# Patient Record
Sex: MALE | Race: WHITE | Employment: OTHER | URBAN - NONMETROPOLITAN AREA
[De-identification: names, ages, dates, MRNs, and addresses within clinical notes are randomized per-mention and may not be internally consistent; named-entity substitution may affect disease eponyms.]

---

## 2021-03-08 ENCOUNTER — OFFICE VISIT (OUTPATIENT)
Dept: DERMATOLOGY | Facility: OTHER | Age: 71
End: 2021-03-08
Attending: DERMATOLOGY
Payer: COMMERCIAL

## 2021-03-08 VITALS
OXYGEN SATURATION: 97 % | DIASTOLIC BLOOD PRESSURE: 72 MMHG | SYSTOLIC BLOOD PRESSURE: 122 MMHG | HEART RATE: 95 BPM | RESPIRATION RATE: 16 BRPM | TEMPERATURE: 97.9 F

## 2021-03-08 DIAGNOSIS — L30.9 DERMATITIS: ICD-10-CM

## 2021-03-08 DIAGNOSIS — L98.9 SKIN LESION: Primary | ICD-10-CM

## 2021-03-08 PROCEDURE — 99202 OFFICE O/P NEW SF 15 MIN: CPT | Mod: 25 | Performed by: DERMATOLOGY

## 2021-03-08 PROCEDURE — 88342 IMHCHEM/IMCYTCHM 1ST ANTB: CPT | Mod: TC | Performed by: DERMATOLOGY

## 2021-03-08 PROCEDURE — 11102 TANGNTL BX SKIN SINGLE LES: CPT | Performed by: DERMATOLOGY

## 2021-03-08 PROCEDURE — 88305 TISSUE EXAM BY PATHOLOGIST: CPT | Mod: TC | Performed by: DERMATOLOGY

## 2021-03-08 RX ORDER — HYDROCHLOROTHIAZIDE 12.5 MG/1
12.5 TABLET ORAL DAILY
COMMUNITY

## 2021-03-08 RX ORDER — TRIAMCINOLONE ACETONIDE 1 MG/G
CREAM TOPICAL
Qty: 30 G | Refills: 3 | Status: SHIPPED | OUTPATIENT
Start: 2021-03-08

## 2021-03-08 RX ORDER — CETIRIZINE HYDROCHLORIDE 10 MG/1
10 TABLET ORAL DAILY
COMMUNITY

## 2021-03-08 RX ORDER — ROSUVASTATIN CALCIUM 40 MG/1
20 TABLET, COATED ORAL DAILY
COMMUNITY

## 2021-03-08 RX ORDER — LISINOPRIL 20 MG/1
10 TABLET ORAL 2 TIMES DAILY
COMMUNITY

## 2021-03-08 ASSESSMENT — PAIN SCALES - GENERAL: PAINLEVEL: NO PAIN (0)

## 2021-03-08 NOTE — NURSING NOTE
Chief Complaint   Patient presents with     Consult     Rash       Initial /72   Pulse 95   Temp 97.9  F (36.6  C) (Tympanic)   Resp 16   SpO2 97%  There is no height or weight on file to calculate BMI.  Medication Reconciliation: complete  Lauryn Tapia LPN

## 2021-03-08 NOTE — LETTER
3/8/2021       RE: Siena Miller  [address]   [address]  [facility] [address]     Dear Colleague,    Thank you for referring your patient, Siena Miller, to the [facility]. Please see a copy of my visit note below.    Visit Date:   2021      SUBJECTIVE:  First visit for Siena, who is a [facility] who comes in because of concerns about lesions on his chest and back.      OBJECTIVE:  Shows on his right cheek a classic actinic keratosis.  On examination of his chest and back, there are numerous seborrheic keratoses.  Also, on the dorsum of his right hand is a patch of eczema, red, itchy, and very typical for that of an irritant type of eczema.      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  Dermoscopy showed that the dark part of the lesion was likely a seborrheic keratosis, but because of its overall appearance, I photographed it and did a shave removal of the lesion and lightly fulgurated the edges and submitted this for microscopic evaluation.        PLAN:  For the hand, 0.1 triamcinolone cream.  Reassured about the other lesions.  We will call with the report of the biopsy, and cryotherapy to the actinic keratosis, right cheek.  Return in 1 year.         NOREEN HUBER MD             D: 2021   T: 2021   MT: DOT      Name:     SIENA MILLER   MRN:      [id number]        Account:      [id number]   :      1950           Visit Date:   2021      Document: [id number]          Again, thank you for allowing me to participate in the care of your patient.      Sincerely,    NOREEN Huber MD

## 2021-03-09 NOTE — PROGRESS NOTES
Visit Date:   2021      SUBJECTIVE:  First visit for Siena, who is a [facility] patient who comes in because of concerns about lesions on his chest and back.      OBJECTIVE:  Shows on his right cheek a classic actinic keratosis.  On examination of his chest and back, there are numerous seborrheic keratoses.  Also, on the dorsum of his right hand is a patch of eczema, red, itchy, and very typical for that of an irritant type of eczema.      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  Dermoscopy showed that the dark part of the lesion was likely a seborrheic keratosis, but because of its overall appearance, I photographed it and did a shave removal of the lesion and lightly fulgurated the edges and submitted this for microscopic evaluation.        PLAN:  For the hand, 0.1 triamcinolone cream.  Reassured about the other lesions.  We will call with the report of the biopsy, and cryotherapy to the actinic keratosis, right cheek.  Return in 1 year.         NOREEN HUBER MD             D: 2021   T: 2021   MT: DOT      Name:     SIENA MILLER   MRN:      [id number]        Account:      [id number]   :      1950           Visit Date:   2021      Document: [id number]

## 2021-03-12 ENCOUNTER — HOSPITAL PATHOLOGY (OUTPATIENT)
Dept: OTHER | Facility: CLINIC | Age: 71
End: 2021-03-12

## 2021-03-22 LAB
COPATH REPORT: NORMAL
COPATH REPORT: NORMAL